# Patient Record
Sex: FEMALE | Race: WHITE | HISPANIC OR LATINO
[De-identification: names, ages, dates, MRNs, and addresses within clinical notes are randomized per-mention and may not be internally consistent; named-entity substitution may affect disease eponyms.]

---

## 2022-07-26 PROBLEM — Z00.00 ENCOUNTER FOR PREVENTIVE HEALTH EXAMINATION: Status: ACTIVE | Noted: 2022-07-26

## 2022-08-18 ENCOUNTER — APPOINTMENT (OUTPATIENT)
Dept: PAIN MANAGEMENT | Facility: CLINIC | Age: 77
End: 2022-08-18

## 2022-08-18 VITALS — BODY MASS INDEX: 40.82 KG/M2 | WEIGHT: 245 LBS | HEIGHT: 65 IN

## 2022-08-18 PROCEDURE — 99204 OFFICE O/P NEW MOD 45 MIN: CPT

## 2022-08-19 NOTE — HISTORY OF PRESENT ILLNESS
[Lower back] : lower back [Sudden] : sudden [9] : 9 [Dull/Aching] : dull/aching [Localized] : localized [Constant] : constant [Household chores] : household chores [Leisure] : leisure [Meds] : meds [Walking] : walking [Bending forward] : bending forward [Extending back] : extending back [] : yes [FreeTextEntry1] : The patient presents for initial evaluation regarding their low back pain. Patient c/o left sided low back pain radiating to the left posterior thigh, stopping at the knee. She reports back pain > LLE pain. Long standing hx with a recent progression. She reports increase pain with prolonged walking. Patient reports having an MILD procedure done on 05/2022 with outside physician in [place]. She reports no observable benefit following the procedure. Was not able to improve walking tolerance. Hx of YASH's in the past with short lived response with multiple outside providers. No benefit with acupuncture. Patient has not done any recent PT. Utilizes OTC Advil PRN. \par  \par Subjective weakness: No \par Lower extremity paresthesias: No \par Bladder/bowel dysfunction: No \par  \par Injections: Yes\par 1) MILD - [place] ([place]) Dr. Zamora \par 2) YASH's - multiple outside providers\par  \par Pertinent Surgical History: N/A \par  \par Imaging: \par MRI Lumbar Spine (06/12/21) -  [facility]\par \par L1-2: There is no significant posterior disc abnormality, spinal canal or foraminal stenosis.\par L2-3: There is diffuse disc bulging, ligamentum flavum thickening and facet arthropathy resulting in mild central canal stenosis with impingement of the descending L3 nerve roots and moderate foraminal narrowing\par L3-4: There is diffuse disc bulging, ligamentum flavum thickening and facet arthropathy resulting in moderate central canal stenosis with impingement of descending L4 nerve roots and severe left moderate right foraminal narrowing compressing the exiting left L3 nerve roots\par L4-5: There is diffuse is bulging, superimposed left foraminal disc herniation, ligamentum flavum thickening and facet arthropathy resulting in moderate to severe central canal stenosis with severe foraminal narrowing compressing the exiting L4 nerve roots\par L5-S1: There is diffuse is bulging, grade 1 anterolisthesis and facet arthropathy resulting in severe right foraminal narrowing compressing exiting right L5 nerve roots\par  \par Physician Disclaimer: I have personally reviewed and confirmed all HPI data with the patient.  [de-identified] : 10/20/2021

## 2022-08-19 NOTE — PHYSICAL EXAM
[de-identified] : Constitutional: \par - No acute distress \par - Obesity \par \par Neurological: \par - normal mood and affect \par - alert and oriented x 3  \par \par Cardiovascular: \par - grossly normal\par \par Lumbar Spine Exam: \par \par Inspection: \par erythema (-) \par ecchymosis (-) \par rashes (-) \par alignment: no scoliosis\par \par Palpation:  \par paraspinal tenderness:                  L (-) ; R (-) \par thoracic paraspinal tenderness:    L (-) ; R (-) \par sciatic nerve tenderness :             L (+) ; R (-) \par SI joint tenderness:                        L (+) ; R (-) \par GTB tenderness:                            L (-);  R (-) \par \par ROM:   \par Full ROM with mild stiffness \par \par Strength:                   Right       Left    \par Hip Flexion:                (5/5)       (5/5) \par Quadriceps:               (5/5)       (5/5) \par Hamstrings:                (5/5)       (5/5) \par Ankle Dorsiflexion:     (5/5)       (5/5) \par EHL:                            (5/5)       (5/5) \par Ankle Plantarflexion:  (5/5)       (5/5) \par \par \par Special Tests: \par SLR:                      R (-) ; L (-) \par Facet loading:       R (-) ; L (+) \par CHARMAINE test:          R (-) ; L (-) \par XSLR:                   R (-) ; L (-) \par Wilfredo's test:        R (-) ; L(-) \par Tight Hamstrings   R (-) ; L (-) \par \par Neurologic: \par Light touch intact throughout LE \par 1+ Reflexes B/L patella \par \par Gait: \par antalgic gait

## 2022-08-19 NOTE — ASSESSMENT
[FreeTextEntry1] : A discussion regarding available pain management treatment options occurred with the patient.  These included interventional, rehabilitative, pharmacological, and alternative modalities. We will proceed with the following: \par \par Interventional treatment options: \par - Proceed with left L3-4, L4-5, L5-S1 facet joint MBB with fluoroscopic guidance; proceed to RFA if adequate relief\par - patient has failed previous YASH's and MILD procedure \par - may be candidate for DCS trial if she is unwilling to consider surgical decompression - not discussed  \par - see additional instructions below \par \par Rehabilitative options: \par - initiate trial of physical therapy \par - participation in active HEP was discussed \par \par Medication based treatment options: \par - initiate trial of gabapentin 300mg TID ; titration schedule provided \par - continue OTC NSAIDs PRN\par - see additional instructions below \par \par Complementary treatment options: \par - Weight management and lifestyle modifications discussed \par - failed acupuncture \par - See additional instructions below \par \par Additional treatment recommendations as follows: \par - ortho spine eval if inadequate relief to conservative care \par - Follow up 1-2 weeks post injection for assessment of efficacy and further recommendations.\par \par Patient presents with axial lumbar pain that has not responded to 3 months of conservative therapy including physical therapy or NSAID therapy.  The pain is interfering with activities of daily living and functionality.  There is no radicular pain.  The pain is exacerbated by facet loading.  Positive Kemps maneuver which is defined by pain reproduction with extension and rotation of the lumbar spine to the affected side.  The patient has not had a vertebral fusion at the levels of the proposed treatment.  There is no unexplained neurologic deficit.  There is no history of systemic infection, unstable medical condition, bleeding tendency, or local infection.  The injection is being performed to diagnose the facet joint as the source of the individual's pain.\par \par The documentation recorded by the scribe, in my presence, accurately reflects the service I personally performed and the decisions made by me with my edits as appropriate.\par \par I, April Eastman acting as scribe, attest that this documentation has been prepared under the direction and in the presence of Provider Ash Mireles DO.

## 2022-09-07 ENCOUNTER — APPOINTMENT (OUTPATIENT)
Dept: PAIN MANAGEMENT | Facility: CLINIC | Age: 77
End: 2022-09-07

## 2022-09-07 PROCEDURE — 64484 NJX AA&/STRD TFRM EPI L/S EA: CPT | Mod: LT

## 2022-09-07 PROCEDURE — 64483 NJX AA&/STRD TFRM EPI L/S 1: CPT | Mod: LT

## 2022-09-07 PROCEDURE — J3490M: CUSTOM

## 2022-09-07 NOTE — PROCEDURE
[FreeTextEntry3] : Date of Service: 09/07/2022 \par \par Account: [id number]\par \par Patient: NI COOMBS \par \par YOB: 1945\par \par Age: 76 year\par \par Surgeon:                  Ash Mireles DO\par \par Assistant:                None\par \par Pre-Operative Diagnosis: Spondylosis of Lumbar Region without Myelopathy or Radiculopathy (M47.816)    \par \par Post Operative Diagnosis: Spondylosis of Lumbar Region without Myelopathy or Radiculopathy (M47.816)    \par \par Procedure:             Left L3-L4, L4-5, L5-S1 facet block under fluoroscopic guidance.\par \par Anesthesia:            MAC\par \par This procedure was carried out using fluoroscopic guidance.  The risks and benefits of the procedure were discussed extensively with the patient.  The consent of the patient was obtained and the following procedure was performed.  A timeout was performed with all essential staff present and the site and side were verified.\par \par The patient was placed in the prone position with a pillow under the abdomen to minimize the lumbar lordosis.  The lumbar area was prepped and draped in a sterile fashion.  The lumbar vertebral bodies were identified and the fluoroscope was obliqued ipsilateral to approximately 30 degrees to reveal the appropriate anatomical view.  The junction of the superior articulate process and transverse process at the left L3, L4, and L5 levels were identified and marked.   The skin at these target points was then localized using 1 cc of 1% Lidocaine at each injection site.  A spinal needle was then introduced and advanced to the above target points at the junction of the SAP and transverse processes until bone was contacted. \par \par After negative aspiration for heme and CSF, an injectate of 1 cc 0.5% Marcaine was injected at each of the injection sites.\par \par The needles were subsequently removed.  Vital signs remained normal.  Pulse oximeter was used throughout the procedure and the patient's pulse and oxygen saturation remained within normal limits.  The patient tolerated the procedure well.  There were no complications.  The patient was instructed to apply ice over the injection sites for twenty minutes every two hours for the next 24 to 48 hours.\par \par Disposition:\par      1. The patient was advised to F/U in 1-2 weeks to assess the response to the injection. \par      2. They were advised to keep a pain diary to report the results of the diagnostic block at their FUV.\par      3. The patient was also instructed to contact me immediately if there were any concerns related to the procedure performed.

## 2022-09-15 ENCOUNTER — APPOINTMENT (OUTPATIENT)
Dept: PAIN MANAGEMENT | Facility: CLINIC | Age: 77
End: 2022-09-15

## 2022-09-15 VITALS — HEIGHT: 65 IN | BODY MASS INDEX: 40.82 KG/M2 | WEIGHT: 245 LBS

## 2022-09-15 PROCEDURE — 99214 OFFICE O/P EST MOD 30 MIN: CPT

## 2022-09-21 ENCOUNTER — APPOINTMENT (OUTPATIENT)
Dept: PAIN MANAGEMENT | Facility: CLINIC | Age: 77
End: 2022-09-21

## 2022-09-21 PROCEDURE — J3490M: CUSTOM

## 2022-09-21 PROCEDURE — 64493 INJ PARAVERT F JNT L/S 1 LEV: CPT | Mod: LT

## 2022-09-21 PROCEDURE — 64494 INJ PARAVERT F JNT L/S 2 LEV: CPT | Mod: LT

## 2022-09-21 PROCEDURE — 64495 INJ PARAVERT F JNT L/S 3 LEV: CPT | Mod: LT

## 2022-09-21 NOTE — PROCEDURE
[FreeTextEntry3] : Date of Service: 09/21/2022 \par \par Account: [id number]\par \par Patient: NI COOMBS \par \par YOB: 1945\par \par Age: 76 year\par \par Surgeon:                  Ash Mireles DO\par \par Assistant:                None\par \par Pre-Operative Diagnosis: Spondylosis of Lumbar Region without Myelopathy or Radiculopathy   \par \par Post Operative Diagnosis: Spondylosis of Lumbar Region without Myelopathy or Radiculopathy\par \par Procedure:             Left L3-L4, L4-5, L5-S1 facet block under fluoroscopic guidance.\par \par Anesthesia:            MAC\par \par This procedure was carried out using fluoroscopic guidance.  The risks and benefits of the procedure were discussed extensively with the patient.  The consent of the patient was obtained and the following procedure was performed.  A timeout was performed with all essential staff present and the site and side were verified.\par \par The patient was placed in the prone position with a pillow under the abdomen to minimize the lumbar lordosis.  The lumbar area was prepped and draped in a sterile fashion.  The lumbar vertebral bodies were identified and the fluoroscope was obliqued ipsilateral to approximately 30 degrees to reveal the appropriate anatomical view.  The junction of the superior articulate process and transverse process at the left L3, L4, and L5 levels were identified and marked.   The skin at these target points was then localized using 1 cc of 1% Lidocaine at each injection site.  A spinal needle was then introduced and advanced to the above target points at the junction of the SAP and transverse processes until bone was contacted. \par \par Fluoroscope then focused on the sacral ala on AP view, and marked at this point.  The skin and subcutaneous structures were localized using 1cc of 1.0 % lidocaine.  A spinal needle was then advanced under fluoroscopic guidance until bone was contacted at the ala. \par \par  After negative aspiration for heme and CSF, an injectate of 1 cc 0.5% Marcaine was injected at each of the injection sites.\par \par The needles were subsequently removed.  Vital signs remained normal.  Pulse oximeter was used throughout the procedure and the patient's pulse and oxygen saturation remained within normal limits.  The patient tolerated the procedure well.  There were no complications.  The patient was instructed to apply ice over the injection sites for twenty minutes every two hours for the next 24 to 48 hours.\par \par Disposition:\par      1. The patient was advised to F/U in 1-2 weeks to assess the response to the injection. \par      2. They were advised to keep a pain diary to report the results of the diagnostic block at their FUV.\par      3. The patient was also instructed to contact me immediately if there were any concerns related to the procedure performed.

## 2022-09-23 NOTE — HISTORY OF PRESENT ILLNESS
[Lower back] : lower back [Sudden] : sudden [5] : 5 [Dull/Aching] : dull/aching [Localized] : localized [Intermittent] : intermittent [Household chores] : household chores [Leisure] : leisure [Meds] : meds [Injection therapy] : injection therapy [Walking] : walking [FreeTextEntry1] : 09/15/22 - Patient presents for a FUV following an left L3-L4, L4-L5, L5-S1 facet joint MBB  #1 on 09/07/22. Patient reports >50% reduction of left sided back pain the day of the procedure. Improved activity of ADL;s. \par \par 08/18/22 - The patient presents for initial evaluation regarding their low back pain. Patient c/o left sided low back pain radiating to the left posterior thigh, stopping at the knee. She reports back pain > LLE pain. Long standing hx with a recent progression. She reports increase pain with prolonged walking. Patient reports having an MILD procedure done on 05/2022 with outside physician in [place]. She reports no observable benefit following the procedure. Was not able to improve walking tolerance. Hx of YASH's in the past with short lived response with multiple outside providers. No benefit with acupuncture. Patient has not done any recent PT. Utilizes OTC Advil PRN. \par  \par Injections:\par 1) MILD procedure - Dr. Zamora ([facility]) \par 2) YASH's - multiple outside providers\par 3) left L3-4, L4-5, L5-S1 facet joint MBB (09/07/22) \par  \par Pertinent Surgical History: N/A \par  \par Imaging: \par MRI Lumbar Spine (06/12/21) -  [facility]\par \par L1-2: There is no significant posterior disc abnormality, spinal canal or foraminal stenosis.\par L2-3: There is diffuse disc bulging, ligamentum flavum thickening and facet arthropathy resulting in mild central canal stenosis with impingement of the descending L3 nerve roots and moderate foraminal narrowing\par L3-4: There is diffuse disc bulging, ligamentum flavum thickening and facet arthropathy resulting in moderate central canal stenosis with impingement of descending L4 nerve roots and severe left moderate right foraminal narrowing compressing the exiting left L3 nerve roots\par L4-5: There is diffuse is bulging, superimposed left foraminal disc herniation, ligamentum flavum thickening and facet arthropathy resulting in moderate to severe central canal stenosis with severe foraminal narrowing compressing the exiting L4 nerve roots\par L5-S1: There is diffuse is bulging, grade 1 anterolisthesis and facet arthropathy resulting in severe right foraminal narrowing compressing exiting right L5 nerve roots\par  \par Physician Disclaimer: I have personally reviewed and confirmed all HPI data with the patient.  [] : This patient has had an injection before: no [FreeTextEntry8] : walking

## 2022-09-23 NOTE — PHYSICAL EXAM
[de-identified] : Constitutional: \par - No acute distress \par - Obesity \par \par Neurological: \par - normal mood and affect \par - alert and oriented x 3  \par \par Cardiovascular: \par - grossly normal\par \par Lumbar Spine Exam: \par \par Inspection: \par erythema (-) \par ecchymosis (-) \par rashes (-) \par alignment: no scoliosis\par \par Palpation:  \par paraspinal tenderness:                  L (-) ; R (-) \par thoracic paraspinal tenderness:    L (-) ; R (-) \par sciatic nerve tenderness :             L (+) ; R (-) \par SI joint tenderness:                        L (+) ; R (-) \par GTB tenderness:                            L (-);  R (-) \par \par ROM:   \par Full ROM with mild stiffness \par \par Strength:                   Right       Left    \par Hip Flexion:                (5/5)       (5/5) \par Quadriceps:               (5/5)       (5/5) \par Hamstrings:                (5/5)       (5/5) \par Ankle Dorsiflexion:     (5/5)       (5/5) \par EHL:                            (5/5)       (5/5) \par Ankle Plantarflexion:  (5/5)       (5/5) \par \par \par Special Tests: \par SLR:                      R (-) ; L (-) \par Facet loading:       R (-) ; L (+) \par CHARMAINE test:          R (-) ; L (-) \par XSLR:                   R (-) ; L (-) \par Wilfredo's test:        R (-) ; L(-) \par Tight Hamstrings   R (-) ; L (-) \par \par Neurologic: \par Light touch intact throughout LE \par 1+ Reflexes B/L patella \par \par Gait: \par antalgic gait

## 2022-09-23 NOTE — ASSESSMENT
[FreeTextEntry1] : A discussion regarding available pain management treatment options occurred with the patient.  These included interventional, rehabilitative, pharmacological, and alternative modalities. We will proceed with the following: \par \par Interventional treatment options: \par - Proceed with repeat left L3-L4, L4-L5, L5-S1 facet joint MBB with fluoroscopic guidance; proceed to RFA if adequate relief\par - patient has failed previous YASH's and MILD procedure with outside providers \par - may be candidate for DCS trial if she is unwilling to consider surgical decompression - not discussed  \par - see additional instructions below \par \par Rehabilitative options: \par - continue trial of physical therapy \par - participation in active HEP was discussed \par \par Medication based treatment options: \par - continue trial of gabapentin 300mg TID ; further titration based upon clinical response \par - continue OTC NSAIDs PRN\par - see additional instructions below \par \par Complementary treatment options: \par - Weight management and lifestyle modifications discussed \par - failed acupuncture \par - See additional instructions below \par \par Additional treatment recommendations as follows: \par - ortho spine eval if inadequate relief to conservative care \par - Follow up 1-2 weeks post injection for assessment of efficacy and further recommendations.\par \par Patient presents with axial lumbar pain that has not responded to 3 months of conservative therapy including physical therapy or NSAID therapy.  The pain is interfering with activities of daily living and functionality.  There is no radicular pain.  The pain is exacerbated by facet loading.  Positive Kemps maneuver which is defined by pain reproduction with extension and rotation of the lumbar spine to the affected side.  The patient has not had a vertebral fusion at the levels of the proposed treatment.  There is no unexplained neurologic deficit.  There is no history of systemic infection, unstable medical condition, bleeding tendency, or local infection.  The injection is being performed to diagnose the facet joint as the source of the individual's pain.\par \par The documentation recorded by the scribe, in my presence, accurately reflects the service I personally performed and the decisions made by me with my edits as appropriate.\par \par I, April Eastman acting as scribe, attest that this documentation has been prepared under the direction and in the presence of Provider Ash Mireles DO.

## 2022-09-23 NOTE — ADDENDUM
[FreeTextEntry1] : Patient requested phone call following second diagnostic medial branch block at the left L3-L4, L4-L5, and L5-S1 facet joint level on 9/21/2022.  The patient experienced greater than 80% reduction of her pain the day of the procedure with ongoing sustained relief of approximately 50%.  She wishes to move forward with radiofrequency ablation.  She has some issues with transportation and cannot make it to the office for follow-up visits.\par \par Patient has lumbosacral axial pain that is consistent with facet joint pathology.  A diagnostic temporary block with local anesthetic of the medial branch was performed and has resulted in at least a 50% reduction in pain for the duration of the specific local anesthetic effect.  The pain is not radicular and there is absence of nerve root compression.  There is no prior spinal fusion surgery at the level targeted.  The pain has failed to respond to three months of conservative therapy.\par \par The risks, benefits and alternatives of the proposed procedure were explained in detail with the patient. The risks outlined include but are not limited to infection, bleeding, post- dural puncture headache, nerve injury, a temporary increase in pain, failure to resolve symptoms, allergic reaction, and possible elevation of blood sugar in diabetics if using corticosteroid.  All questions were answered to patient's apparent satisfaction and he/she verbalized an understanding.

## 2022-09-26 ENCOUNTER — APPOINTMENT (OUTPATIENT)
Dept: PAIN MANAGEMENT | Facility: CLINIC | Age: 77
End: 2022-09-26

## 2022-09-30 ENCOUNTER — APPOINTMENT (OUTPATIENT)
Dept: PAIN MANAGEMENT | Facility: CLINIC | Age: 77
End: 2022-09-30

## 2022-10-05 ENCOUNTER — APPOINTMENT (OUTPATIENT)
Dept: PAIN MANAGEMENT | Facility: CLINIC | Age: 77
End: 2022-10-05

## 2022-10-05 PROCEDURE — 64636Z: CUSTOM | Mod: LT

## 2022-10-05 PROCEDURE — J3490M: CUSTOM

## 2022-10-05 PROCEDURE — 64635 DESTROY LUMB/SAC FACET JNT: CPT | Mod: LT

## 2022-10-05 NOTE — PROCEDURE
[FreeTextEntry3] : Date of Service: 10/05/2022 \par \par Account: [id number]\par \par Patient: NI COOMBS \par \par YOB: 1945\par \par Age: 76 year\par \par Surgeon:                  Ash Mireles DO\par \par Assistant:                None\par \par Pre-Operative Diagnosis:   Spondylosis of Lumbar Region without Myelopathy or Radiculopathy\par  \par Post Operative Diagnosis: Spondylosis of Lumbar Region without Myelopathy or Radiculopathy \par \par Procedure:             Left L3-L4, L4-5, L5-S1 facet joint radiofrequency ablation with fluoroscopic guidance.\par \par Anesthesia:            MAC\par \par This procedure was carried out using fluoroscopic guidance.  The risks and benefits of the procedure were discussed extensively with the patient.  The consent of the patient was obtained and the following procedure was performed. The patient was placed in the prone position on the fluoroscopy table and the area was prepped and draped in a sterile fashion.  A timeout was performed with all essential staff present and the site and side were verified.\par \par Under A/P visualization, the left sacral ala was identified and marked.  Using a 25 gauge needle the skin and subcutaneous structures at this point were localized with approximately 3 mL of 1% Lidocaine.  After this, an 18-gauge 15 cm radiofrequency needle with a 10 mm curved active tip was inserted under fluoroscopic visualization until the needle made contact with the sacral ala. \par \par The fluoroscope was then redirected under A/P view to visualize the left L3-L4, L4-L5, L5-S1 facet joint levels. The camera was obliqued to approximately 30 degrees to reveal good Gomez dog anatomical view. The junction of the superior articulate process and transverse process at the targeted levels were then identified and marked. Skin and subcutaneous structures were then anesthetized with approximately 3 mL of 1% Lidocaine at each of these levels.  \par \par After this, an 18-gauge 15 cm radiofrequency needle with a 10 mm curved active tip then advanced until the junction at the SAP and transverse process was met at each level.  Both ipsilateral oblique and lateral fluoroscopic views were obtained in order to advance the needle to the intended targets which was at the junction of the SAP and transverse process. \par \par At all the treated levels, sensory stimulation was performed at 50 Hz not exceeding 1.0 volt and motor stimulation testing at 2 Hz not exceeding 3.0 volts.  There was no abnormal sensory or motor stimulation observed or reported by the patient most notably in the lower extremities.\par \par Each treated level was then anesthetized with approximately 1 ml of 0.25% Marcaine. After which each area was then ablated at 80 degrees centigrade for 90 seconds each. Patient felt no pain reproduction during the ablation procedure.\par \par The needles were subsequently removed.  Vital signs remained normal.  Pulse oximeter was used throughout the procedure and the patient's pulse and oxygen saturation remained within normal limits.  The patient tolerated the procedure well.  There were no complications.  The patient was instructed to apply ice over the injection sites for twenty minutes every two hours for the next 24 to 48 hours.\par \par Disposition:\par      1. The patient was advised to F/U in 4 weeks to assess the response to the procedure. \par      2. The patient was also instructed to contact me immediately if there were any concerns related to the procedure performed.

## 2022-10-17 ENCOUNTER — APPOINTMENT (OUTPATIENT)
Dept: PAIN MANAGEMENT | Facility: CLINIC | Age: 77
End: 2022-10-17

## 2022-10-20 ENCOUNTER — APPOINTMENT (OUTPATIENT)
Dept: PAIN MANAGEMENT | Facility: CLINIC | Age: 77
End: 2022-10-20

## 2022-10-20 VITALS — HEIGHT: 65 IN | WEIGHT: 245 LBS | BODY MASS INDEX: 40.82 KG/M2

## 2022-10-20 PROCEDURE — 99213 OFFICE O/P EST LOW 20 MIN: CPT

## 2022-10-21 NOTE — PHYSICAL EXAM
[de-identified] : Constitutional: \par - No acute distress \par - Obesity \par \par Neurological: \par - normal mood and affect \par - alert and oriented x 3  \par \par Cardiovascular: \par - Bilateral lower extremity edema\par \par Lumbar Spine Exam: \par \par Inspection: \par erythema (-) \par ecchymosis (-) \par rashes (-) \par alignment: no scoliosis\par \par Palpation:  \par paraspinal tenderness:                  L (-) ; R (-) \par thoracic paraspinal tenderness:    L (-) ; R (-) \par sciatic nerve tenderness :             L (+) ; R (-) \par SI joint tenderness:                        L (+) ; R (-) \par GTB tenderness:                            L (-);  R (-) \par \par ROM:   \par Full ROM with mild stiffness at extremes of extension\par Pain at extremes of extension\par \par Strength:                   Right       Left    \par Hip Flexion:                (5/5)       (5/5) \par Quadriceps:               (5/5)       (5/5) \par Hamstrings:                (5/5)       (5/5) \par Ankle Dorsiflexion:     (5/5)       (5/5) \par EHL:                            (5/5)       (5/5) \par Ankle Plantarflexion:  (5/5)       (5/5) \par \par Special Tests: \par SLR:                      R (-) ; L (-) \par Facet loading:       R (-) ; L (+) \par CHARMAINE test:          R (-) ; L (-)\par Tight Hamstrings   R (-) ; L (-) \par \par Neurologic: \par Light touch intact throughout LE bilaterally\par 1+ Reflexes B/L patella bilaterally\par Absent Achilles reflexes bilaterally\par \par Gait: \par antalgic gait \par Ambulates with assistance of walker

## 2022-10-21 NOTE — ASSESSMENT
[FreeTextEntry1] : A discussion regarding available pain management treatment options occurred with the patient.  These included interventional, rehabilitative, pharmacological, and alternative modalities. We will proceed with the following: \par \par Interventional treatment options: \par - could proceed with repeat left L3-L4, L4-L5, L5-S1 facet joint RFA with return of severe axial lower back pain; target q6-12 months. \par - patient has failed previous YASH's and MILD procedure with outside providers \par - may be candidate for DCS trial if she is unwilling to consider surgical decompression - briefly discussed in further detail\par - see additional instructions below \par \par Rehabilitative options: \par - continue trial of physical therapy \par - participation in active HEP was discussed \par \par Medication based treatment options: \par - continue trial of gabapentin 300mg TID ; further titration based upon clinical response.  Caution given questionable lower extremity edema\par - continue OTC NSAIDs PRN\par - see additional instructions below \par \par Complementary treatment options: \par - Weight management and lifestyle modifications discussed \par - failed acupuncture\par \par Additional treatment recommendations as follows: \par - ortho spine evaluation if inadequate relief to conservative care \par - Follow up in 6-8 weeks to assess on going response\par \par We have discussed the risks, benefits, and alternatives NSAID therapy including but not limited to the risk of bleeding, thrombosis, gastric mucosal irritation/ulceration, allergic reaction and kidney dysfunction; the patient verbalizes an understanding.\par \par The documentation recorded by the scribe, in my presence, accurately reflects the service I personally performed and the decisions made by me with my edits as appropriate.\par \par I, Srinivas Villanueva acting as scribe, attest that this documentation has been prepared under the direction and in the presence of Provider Ash Mireles DO.

## 2022-10-21 NOTE — HISTORY OF PRESENT ILLNESS
[Lower back] : lower back [Sudden] : sudden [5] : 5 [Dull/Aching] : dull/aching [Localized] : localized [Intermittent] : intermittent [Household chores] : household chores [Leisure] : leisure [Meds] : meds [Injection therapy] : injection therapy [Walking] : walking [FreeTextEntry1] : 10/20/2022 -  Patient presents after left L3-L4, L4-L5, L5-S1 facet joint RFA on 10/5/2022.  Patient reports an overall >80% sustained relief of axial left sided lower back pain.  She reports improved walking and standing tolerance. Reports that she has been ambulating more often without the assistance of her walker. Patient reports a minimal to no left axial lower back pain, reports that the majority of her pain is in the left lower buttock and thigh. Patient reports that since she has been taking gabapentin (currently on 300mg TID) she began to experience mild edema in her bilateral lower extremities.  Despite this she finds gabapentin helpful in reducing her pain. \par \par 9/15/22 - Patient presents for a FUV following an left L3-L4, L4-L5, L5-S1 facet joint MBB  #1 on 09/07/22. Patient reports >50% reduction of left sided back pain the day of the procedure. Improved activity of ADL;s. \par \par 08/18/22 - The patient presents for initial evaluation regarding their low back pain. Patient c/o left sided low back pain radiating to the left posterior thigh, stopping at the knee. She reports back pain > LLE pain. Long standing hx with a recent progression. She reports increase pain with prolonged walking. Patient reports having an MILD procedure done on 05/2022 with outside physician in [place]. She reports no observable benefit following the procedure. Was not able to improve walking tolerance. Hx of YASH's in the past with short lived response with multiple outside providers. No benefit with acupuncture. Patient has not done any recent PT. Utilizes OTC Advil PRN. \par  \par Injections:\par 1) MILD procedure - Dr. Zamora ([facility]) \par 2) YASH's - multiple outside providers\par 3) Left L3-4, L4-5, L5-S1 facet joint MBB (09/07/22, 09/21/22)\par 4) left L3-4, L4-5, L5-S1 facet joint RFA (10/5/2022) \par \par Pertinent Surgical History: N/A \par  \par Imaging: \par MRI Lumbar Spine (06/12/21) -  [facility]\par \par L1-2: There is no significant posterior disc abnormality, spinal canal or foraminal stenosis.\par L2-3: There is diffuse disc bulging, ligamentum flavum thickening and facet arthropathy resulting in mild central canal stenosis with impingement of the descending L3 nerve roots and moderate foraminal narrowing\par L3-4: There is diffuse disc bulging, ligamentum flavum thickening and facet arthropathy resulting in moderate central canal stenosis with impingement of descending L4 nerve roots and severe left moderate right foraminal narrowing compressing the exiting left L3 nerve roots\par L4-5: There is diffuse is bulging, superimposed left foraminal disc herniation, ligamentum flavum thickening and facet arthropathy resulting in moderate to severe central canal stenosis with severe foraminal narrowing compressing the exiting L4 nerve roots\par L5-S1: There is diffuse is bulging, grade 1 anterolisthesis and facet arthropathy resulting in severe right foraminal narrowing compressing exiting right L5 nerve roots\par  \par Physician Disclaimer: I have personally reviewed and confirmed all HPI data with the patient.  [] : no [FreeTextEntry8] : walking

## 2022-12-08 ENCOUNTER — APPOINTMENT (OUTPATIENT)
Dept: PAIN MANAGEMENT | Facility: CLINIC | Age: 77
End: 2022-12-08

## 2023-02-23 ENCOUNTER — RX RENEWAL (OUTPATIENT)
Age: 78
End: 2023-02-23

## 2023-06-05 ENCOUNTER — APPOINTMENT (OUTPATIENT)
Dept: PAIN MANAGEMENT | Facility: CLINIC | Age: 78
End: 2023-06-05
Payer: MEDICARE

## 2023-06-05 VITALS — WEIGHT: 245 LBS | HEIGHT: 65 IN | BODY MASS INDEX: 40.82 KG/M2

## 2023-06-05 PROCEDURE — 99214 OFFICE O/P EST MOD 30 MIN: CPT

## 2023-06-05 NOTE — PHYSICAL EXAM
[de-identified] : Constitutional: \par - No acute distress \par - Obesity \par \par Neurological: \par - normal mood and affect \par - alert and oriented x 3  \par \par Cardiovascular: \par - Bilateral lower extremity edema\par \par Lumbar Spine Exam: \par \par Inspection: \par erythema (-) \par ecchymosis (-) \par rashes (-) \par alignment: no scoliosis\par \par Palpation:  \par paraspinal tenderness:                  L (-) ; R (-) \par thoracic paraspinal tenderness:    L (-) ; R (-) \par sciatic nerve tenderness :             L (+) ; R (-) \par SI joint tenderness:                        L (+) ; R (-) \par GTB tenderness:                            L (-);  R (-) \par \par ROM:   \par Full ROM with mild stiffness at extremes of extension\par Pain at extremes of extension\par \par Strength:                   Right       Left    \par Hip Flexion:                (5/5)       (5/5) \par Quadriceps:               (5/5)       (5/5) \par Hamstrings:                (5/5)       (5/5) \par Ankle Dorsiflexion:     (5/5)       (5/5) \par EHL:                            (5/5)       (5/5) \par Ankle Plantarflexion:  (5/5)       (5/5) \par \par Special Tests: \par SLR:                      R (-) ; L (+) \par Facet loading:       R (-) ; L (-) \par CHARMAINE test:          R (-) ; L (-)\par Tight Hamstrings   R (-) ; L (-) \par \par Neurologic: \par Light touch intact throughout LE bilaterally\par 1+ Reflexes B/L patella bilaterally\par Absent Achilles reflexes bilaterally\par \par Gait: \par antalgic gait \par Ambulates with assistance of walker

## 2023-06-05 NOTE — ASSESSMENT
[FreeTextEntry1] : A discussion regarding available pain management treatment options occurred with the patient.  These included interventional, rehabilitative, pharmacological, and alternative modalities. We will proceed with the following: \par \par Interventional treatment options:\par - Can consider trial of lumbar YASH for left radicular symptoms pending updated MRI imaging\par - could proceed with repeat left L3-L4, L4-L5, L5-S1 facet joint RFA with return of severe axial lower back pain\par - patient has failed previous YASH's and MILD procedure with outside providers \par - Discussed [place] DCS trial in further detail; informational materials provided\par - see additional instructions below \par \par Rehabilitative options: \par - continue trial of physical therapy \par - participation in active HEP was discussed \par \par Medication based treatment options: \par - continue  gabapentin 300mg TID; further titration based upon clinical response\par - Caution with increasing doses of gabapentin and consideration of lower extremity edema\par - continue OTC NSAIDs on PRN basis\par - see additional instructions below \par \par Complementary treatment options: \par - Weight management and lifestyle modifications discussed \par - failed acupuncture\par \par Additional treatment recommendations as follows: \par - Patient continues to defer ortho spine evaluation\par - Obtain new MRI lumbar spine to assess progression of spinal stenosis \par - obtain Thoracic MRI - pre DCS planning\par - Follow-up for MRI imaging review\par \par We have discussed the risks, benefits, and alternatives NSAID therapy including but not limited to the risk of bleeding, thrombosis, gastric mucosal irritation/ulceration, allergic reaction and kidney dysfunction; the patient verbalizes an understanding.\par \par The documentation recorded by the scribe, in my presence, accurately reflects the service I personally performed and the decisions made by me with my edits as appropriate.\par \par I, Timothy ESTRADA, personally performed the services described in this documentation incident to Ash Mireles DO.

## 2023-06-05 NOTE — HISTORY OF PRESENT ILLNESS
[Lower back] : lower back [Sudden] : sudden [5] : 5 [Dull/Aching] : dull/aching [Localized] : localized [Intermittent] : intermittent [Household chores] : household chores [Leisure] : leisure [Meds] : meds [Injection therapy] : injection therapy [Walking] : walking [FreeTextEntry1] : 6/5/2023- Patient presents for FUV regarding their lower back pain.  Patient back from [place].  She continue to complain of left buttock pain with radiation down her leg associated with numbness.  She continues to take Gabapentin 300 mg TID.  She was recently given a TPI in her left buttock by a pain physician and was started on Lyrica.  Patient self D/c Lyrica d/t being ineffective.  Patient continues to participate in physical therapy with benefit.  Continues to report benefit of left-sided axial low back pain from RFA performed over 8 months ago.\par \par 10/20/2022 -  Patient presents after left L3-L4, L4-L5, L5-S1 facet joint RFA on 10/5/2022.  Patient reports an overall >80% sustained relief of axial left sided lower back pain.  She reports improved walking and standing tolerance. Reports that she has been ambulating more often without the assistance of her walker. Patient reports a minimal to no left axial lower back pain, reports that the majority of her pain is in the left lower buttock and thigh. Patient reports that since she has been taking gabapentin (currently on 300mg TID) she began to experience mild edema in her bilateral lower extremities.  Despite this she finds gabapentin helpful in reducing her pain. \par \par 9/15/22 - Patient presents for a FUV following an left L3-L4, L4-L5, L5-S1 facet joint MBB  #1 on 09/07/22. Patient reports >50% reduction of left sided back pain the day of the procedure. Improved activity of ADL;s. \par \par 08/18/22 - The patient presents for initial evaluation regarding their low back pain. Patient c/o left sided low back pain radiating to the left posterior thigh, stopping at the knee. She reports back pain > LLE pain. Long standing hx with a recent progression. She reports increase pain with prolonged walking. Patient reports having an MILD procedure done on 05/2022 with outside physician in [place]. She reports no observable benefit following the procedure. Was not able to improve walking tolerance. Hx of YASH's in the past with short lived response with multiple outside providers. No benefit with acupuncture. Patient has not done any recent PT. Utilizes OTC Advil PRN. \par  \par Injections:\par 1) MILD procedure - Dr. Zamora ([facility]) \par 2) YASH's - multiple outside providers\par 3) left L3-4, L4-5, L5-S1 facet joint RFA (10/5/2022) \par \par Pertinent Surgical History: N/A \par  \par Imaging: \par MRI Lumbar Spine (06/12/21) -  [facility]\par \par L1-2: There is no significant posterior disc abnormality, spinal canal or foraminal stenosis.\par L2-3: There is diffuse disc bulging, ligamentum flavum thickening and facet arthropathy resulting in mild central canal stenosis with impingement of the descending L3 nerve roots and moderate foraminal narrowing\par L3-4: There is diffuse disc bulging, ligamentum flavum thickening and facet arthropathy resulting in moderate central canal stenosis with impingement of descending L4 nerve roots and severe left moderate right foraminal narrowing compressing the exiting left L3 nerve roots\par L4-5: There is diffuse is bulging, superimposed left foraminal disc herniation, ligamentum flavum thickening and facet arthropathy resulting in moderate to severe central canal stenosis with severe foraminal narrowing compressing the exiting L4 nerve roots\par L5-S1: There is diffuse is bulging, grade 1 anterolisthesis and facet arthropathy resulting in severe right foraminal narrowing compressing exiting right L5 nerve roots\par  \par Physician Disclaimer: I have personally reviewed and confirmed all HPI data with the patient.  [] : This patient has had an injection before: no [FreeTextEntry8] : walking

## 2023-06-07 ENCOUNTER — FORM ENCOUNTER (OUTPATIENT)
Age: 78
End: 2023-06-07

## 2023-06-08 ENCOUNTER — APPOINTMENT (OUTPATIENT)
Dept: MRI IMAGING | Facility: CLINIC | Age: 78
End: 2023-06-08
Payer: MEDICARE

## 2023-06-08 PROCEDURE — 72148 MRI LUMBAR SPINE W/O DYE: CPT | Mod: MH

## 2023-06-08 PROCEDURE — 72146 MRI CHEST SPINE W/O DYE: CPT | Mod: MH

## 2023-06-22 ENCOUNTER — APPOINTMENT (OUTPATIENT)
Dept: PAIN MANAGEMENT | Facility: CLINIC | Age: 78
End: 2023-06-22
Payer: MEDICARE

## 2023-06-22 VITALS — BODY MASS INDEX: 40.82 KG/M2 | WEIGHT: 245 LBS | HEIGHT: 65 IN

## 2023-06-22 PROCEDURE — 99214 OFFICE O/P EST MOD 30 MIN: CPT

## 2023-06-22 NOTE — ASSESSMENT
[FreeTextEntry1] : A discussion regarding available pain management treatment options occurred with the patient.  These included interventional, rehabilitative, pharmacological, and alternative modalities. We will proceed with the following: \par \par Interventional treatment options:\par - Proceed with left L3-L4, L4-L5 TFESI with fluoroscopic guidance\par - We will proceed with [place] DCS trial for refractory radicular pain following YASH\par - could proceed with repeat left L3-L4, L4-L5, L5-S1 facet joint RFA with return of severe axial lower back pain\par - patient has failed previous YASH's and MILD procedure with outside providers \par - see additional instructions below \par \par Rehabilitative options: \par - continue physical therapy \par - participation in active HEP was discussed \par \par Medication based treatment options: \par -Increase gabapentin 1200 mg daily; further titration based upon clinical response\par - Caution with increasing doses of gabapentin and consideration of lower extremity edema\par - continue OTC NSAIDs on PRN basis\par - see additional instructions below \par \par Complementary treatment options: \par - Weight management and lifestyle modifications discussed \par - failed acupuncture\par \par Additional treatment recommendations as follows: \par - Patient continues to defer ortho spine evaluation\par - Obtain psychological evaluation in anticipation of DCS trial -referral provided\par - Follow up 1-2 weeks post injection for assessment of efficacy and further treatment recommendations\par \par We have discussed the risks, benefits, and alternatives NSAID therapy including but not limited to the risk of bleeding, thrombosis, gastric mucosal irritation/ulceration, allergic reaction and kidney dysfunction; the patient verbalizes an understanding.\par \par The risks, benefits and alternatives of the proposed procedure were explained in detail with the patient. The risks outlined include but are not limited to infection, bleeding, post- dural puncture headache, nerve injury, a temporary increase in pain, failure to resolve symptoms, allergic reaction, and possible elevation of blood sugar in diabetics if using corticosteroid.  All questions were answered to patient's apparent satisfaction and he/she verbalized an understanding.\par \par The documentation recorded by the scribe, in my presence, accurately reflects the service I personally performed and the decisions made by me with my edits as appropriate.\par \par I, Geo Singh acting as scribe, attest that this documentation has been prepared under the direction and in the presence of Provider Ash Mireles DO.

## 2023-06-22 NOTE — HISTORY OF PRESENT ILLNESS
[Lower back] : lower back [Sudden] : sudden [5] : 5 [Dull/Aching] : dull/aching [Localized] : localized [Intermittent] : intermittent [Household chores] : household chores [Leisure] : leisure [Meds] : meds [Injection therapy] : injection therapy [Walking] : walking [FreeTextEntry1] : 6/22/2023- Patient presents for FUV regarding their lower back pain, and to review the lumbar and thoracic spine MRIs done on 6/8/2023.   Her pain symptoms have not changed since the last visit, her lower back pain remains resolved from the previous RFA, and she still has radicular pain in the lateral and anterior left thigh, and occasionally the posterior left thigh.  Wishes to discuss candidacy for DCS trial\par \par 6/5/2023- Patient presents for FUV regarding their lower back pain.  Patient back from [place].  She continue to complain of left buttock pain with radiation down her leg associated with numbness.  She continues to take Gabapentin 300 mg TID.  She was recently given a TPI in her left buttock by a pain physician and was started on Lyrica.  Patient self D/c Lyrica d/t being ineffective.  Patient continues to participate in physical therapy with benefit.  Continues to report benefit of left-sided axial low back pain from RFA performed over 8 months ago.\par \par 10/20/2022 -  Patient presents after left L3-L4, L4-L5, L5-S1 facet joint RFA on 10/5/2022.  Patient reports an overall >80% sustained relief of axial left sided lower back pain.  She reports improved walking and standing tolerance. Reports that she has been ambulating more often without the assistance of her walker. Patient reports a minimal to no left axial lower back pain, reports that the majority of her pain is in the left lower buttock and thigh. Patient reports that since she has been taking gabapentin (currently on 300mg TID) she began to experience mild edema in her bilateral lower extremities.  Despite this she finds gabapentin helpful in reducing her pain. \par \par 9/15/22 - Patient presents for a FUV following an left L3-L4, L4-L5, L5-S1 facet joint MBB  #1 on 09/07/22. Patient reports >50% reduction of left sided back pain the day of the procedure. Improved activity of ADL;s. \par \par 08/18/22 - The patient presents for initial evaluation regarding their low back pain. Patient c/o left sided low back pain radiating to the left posterior thigh, stopping at the knee. She reports back pain > LLE pain. Long standing hx with a recent progression. She reports increase pain with prolonged walking. Patient reports having an MILD procedure done on 05/2022 with outside physician in [place]. She reports no observable benefit following the procedure. Was not able to improve walking tolerance. Hx of YASH's in the past with short lived response with multiple outside providers. No benefit with acupuncture. Patient has not done any recent PT. Utilizes OTC Advil PRN. \par  \par Injections:\par 1) MILD procedure - Dr. Zamora ([facility]) \par 2) YASH's - multiple outside providers\par 3) left L3-4, L4-5, L5-S1 facet joint RFA (10/5/2022) \par \par Pertinent Surgical History: N/A \par  \par Imaging: \par 1) MRI Lumbar Spine (6/8/2023)- [facility]\par Findings: There is exaggerated thoracic kyphosis, exaggerated lumbar lordosis, and convexity of the upper \par lumbar spine towards the right and lower lumbar spine towards the left with grade 1 anterolisthesis at L3-L4, \par L4-L5, and L5-S1. There are degenerative changes in the lower thoracic spine with right paracentral/foraminal \par herniation impinging the right exiting nerve root at T10-T11. The conus appears unremarkable. There are findings suggesting multiple small bilateral renal cysts incompletely evaluated on the current exam. There is no gross retroperitoneal adenopathy or mass.\par L1-L2: There is mild diffuse disc bulging and facet hypertrophy.\par L2-L3: There is disc bulging, bony ridging, facet arthrosis, broad-based posterior disc herniation, mild central stenosis, and bilateral exiting L2 nerve root impingement.\par L3-L4: There is disc bulging, bony ridging, facet arthrosis, broad-based posterior disc herniation, moderate central stenosis, and left greater than right exiting nerve root impingement.\par L4-L5: There is disc bulging, bony ridging, facet arthrosis, broad-based posterior disc herniation, moderate-tosevere central stenosis asymmetric on the right and impingement upon right greater than left exiting L4 nerve \par roots.\par L5-S1: There is disc bulging, bony ridging, facet arthrosis, and right foraminal herniation impinging the right exiting L5 nerve root.\par \par 2) MRI Thoracic Spine (6/8/2023)- [facility]\par Findings: There is straightening of the visualized cervical lordosis and degenerative changes in the visualized \par cervical spine, incompletely evaluated on the current exam. There is mild exaggerated thoracic kyphosis. There \par is multilevel disc desiccation in the thoracic spine. There is loss of disc height with mild degenerative endplate \par changes at C7-T1. There is a small posterior central protrusion effacing the thecal sac at T5-T6. There is a small \par posterior central protrusion effacing the thecal sac at T6-T7. There is a small posterior central protrusion \par effacing the thecal sac at T7-T8. There is a right paracentral/foraminal herniation impinging the right exiting \par nerve root at T10-T11 with asymmetric right foraminal narrowing. There are mild scattered degenerative \par endplate changes and anterior spondylosis without acute thoracic vertebral body fracture. The thoracic spinal \par cord appears intact. There is nonspecific soft tissue scarring and fibrosis in the subcutaneous fat along the \par midline of the upper thoracic spine possibly related to prior surgery without edema or swelling in the area. \par There are findings suggesting multiple small bilateral renal cysts incompletely evaluated on the current exam. \par There is no gross paravertebral soft tissue mass. There is no cord compression.\par \par 3) MRI Lumbar Spine (06/12/21) -  [facility]\par \par Physician Disclaimer: I have personally reviewed and confirmed all HPI data with the patient.  [] : This patient has had an injection before: no [FreeTextEntry8] : walking

## 2023-06-22 NOTE — PHYSICAL EXAM
[de-identified] : Constitutional: \par - No acute distress \par - Obesity \par \par Neurological: \par - normal mood and affect \par - alert and oriented x 3  \par \par Cardiovascular: \par - Bilateral lower extremity edema\par \par Lumbar Spine Exam: \par \par Inspection: \par erythema (-) \par ecchymosis (-) \par rashes (-) \par alignment: no scoliosis\par \par Palpation:  \par paraspinal tenderness:                  L (-) ; R (-) \par thoracic paraspinal tenderness:    L (-) ; R (-) \par sciatic nerve tenderness :             L (+) ; R (-) \par SI joint tenderness:                        L (+) ; R (-) \par GTB tenderness:                            L (-);  R (-) \par \par ROM:   \par Full ROM with mild stiffness at extremes of extension\par Pain at extremes of extension\par \par Strength:                   Right       Left    \par Hip Flexion:                (5/5)       (5/5) \par Quadriceps:               (5/5)       (5/5) \par Hamstrings:                (5/5)       (5/5) \par Ankle Dorsiflexion:     (5/5)       (5/5) \par EHL:                            (5/5)       (5/5) \par Ankle Plantarflexion:  (5/5)       (5/5) \par \par Special Tests: \par SLR:                      R (-) ; L (+) \par Facet loading:       R (-) ; L (-) \par CHARMAINE test:          R (-) ; L (-)\par Tight Hamstrings   R (-) ; L (-) \par \par Neurologic: \par Light touch intact throughout LE bilaterally\par 1+ Reflexes B/L patella bilaterally\par Absent Achilles reflexes bilaterally\par \par Gait: \par antalgic gait \par Ambulates with assistance of walker

## 2023-06-30 ENCOUNTER — APPOINTMENT (OUTPATIENT)
Dept: PAIN MANAGEMENT | Facility: CLINIC | Age: 78
End: 2023-06-30
Payer: MEDICARE

## 2023-06-30 PROCEDURE — 64483 NJX AA&/STRD TFRM EPI L/S 1: CPT | Mod: LT

## 2023-06-30 PROCEDURE — 64484 NJX AA&/STRD TFRM EPI L/S EA: CPT | Mod: LT

## 2023-06-30 NOTE — PROCEDURE
[FreeTextEntry3] : Date of Service: 06/30/2023 \par \par Account: [id number]\par \par Patient: NI COOMBS \par \par YOB: 1945\par \par Age: 77 year\par \par Surgeon: Ash Mireles DO\par \par Assistant: None.\par \par Pre-Operative Diagnosis:   Lumbosacral Radiculitis (M54.17)\par \par Post Operative Diagnosis: Lumbosacral Radiculitis (M54.17)\par \par Procedure: Left L3-L4, L4-L5 transforaminal epidural steroid injection under fluoroscopic guidance.\par \par Anesthesia: MAC\par \par This procedure was carried out using fluoroscopic guidance.  The risks and benefits of the procedure were discussed extensively with the patient.  The consent of the patient was obtained and the following procedure was performed. The patient was placed in the prone position on the fluoroscopy table and the area was prepped and draped in a sterile fashion.  A timeout was performed with all essential staff present and the site and side were verified.\par \par The left L3-L4 neural foramen was then identified on right oblique "carmen dog" anatomical view at the 6 o' clock position using fluoroscopic guidance, and the area was marked. The overlying skin and subcutaneous structures were anesthetized using sterile technique with 1% Lidocaine.   A 22 gauge 5 inch spinal needle was directed toward the inferior (6 o'clock) position of the pedicle, which formed the roof of the identified foramen.  Once in the epidural space, after negative aspiration for heme and CSF, 1cc of Omnipaque contrast was injected to confirm epidural location and assess filling defects and rule out intravascular needle placement.\par \par Lumbar epidurogram showed no intravascular or intrathecal flow pattern.  No blood or CSF was aspirated.  Omnipaque spread medially in epidural space and outlined the exiting nerve root.\par \par After this, 2.5 cc of a mixture of 4 cc of preservative free normal saline plus 40 mg of Kenalog was injected in the epidural space\par \par The left L4-L5 neural foramen was then identified on right oblique "carmen dog" anatomical view at the 6 o' clock position using fluoroscopic guidance, and the area was marked. The overlying skin and subcutaneous structures were anesthetized using sterile technique with 1% Lidocaine.   A 22 gauge 5 inch spinal needle was directed toward the inferior (6 o'clock) position of the pedicle, which formed the roof of the identified foramen.  Once in the epidural space, after negative aspiration for heme and CSF, 1cc of Omnipaque contrast was injected to confirm epidural location and assess filling defects and rule out intravascular needle placement.\par \par Lumbar epidurogram showed no intravascular or intrathecal flow pattern.  No blood or CSF was aspirated. Omnipaque spread medially in epidural space and outlined the exiting nerve root.\par \par After this, the remainder of the injectate listed above was injected in the epidural space.\par \par Vital signs remained normal throughout the procedure.  The patient tolerated the procedure well.  There were no immediate complications from the performed procedure.  The patient was instructed to apply ice over the injection sites for twenty minutes every two hours for the next 24 hours.\par \par Disposition:\par      1. The patient was advised to F/U in 1-2 weeks to assess the response to the injection.\par      2. The patient was also instructed to contact me immediately if there were any concerns related to the procedure performed.

## 2023-07-17 ENCOUNTER — APPOINTMENT (OUTPATIENT)
Dept: PAIN MANAGEMENT | Facility: CLINIC | Age: 78
End: 2023-07-17
Payer: MEDICARE

## 2023-07-17 VITALS — WEIGHT: 245 LBS | BODY MASS INDEX: 40.82 KG/M2 | HEIGHT: 65 IN

## 2023-07-17 PROCEDURE — 99214 OFFICE O/P EST MOD 30 MIN: CPT

## 2023-07-17 RX ORDER — GABAPENTIN 600 MG/1
600 TABLET, COATED ORAL 3 TIMES DAILY
Qty: 270 | Refills: 1 | Status: ACTIVE | COMMUNITY
Start: 2023-07-17 | End: 1900-01-01

## 2023-07-17 RX ORDER — GABAPENTIN 300 MG/1
300 CAPSULE ORAL 3 TIMES DAILY
Qty: 90 | Refills: 2 | Status: DISCONTINUED | COMMUNITY
Start: 2022-08-18 | End: 2023-07-17

## 2023-07-17 NOTE — HISTORY OF PRESENT ILLNESS
[Lower back] : lower back [Sudden] : sudden [9] : 9 [Dull/Aching] : dull/aching [Localized] : localized [Constant] : constant [Household chores] : household chores [Leisure] : leisure [Meds] : meds [Walking] : walking [Bending forward] : bending forward [Extending back] : extending back [] : yes [FreeTextEntry1] : 7/17/2023 - Patient presents for follow-up visit after left L3-L4, L4-L5 TFESI on 6/30/2023.  She reports some improvement of her left radicular pain following YASH.  She feels that she is doing better than she has done over the last few years following interventional treatment.  She still continues to experience pain and limitation in functionality however.  She is currently involved in physical therapy also with benefit.  Currently on gabapentin 600 mg BID.  Wishes to discuss DCS trial once again in further detail.\par \par 6/22/2023- Patient presents for FUV regarding their lower back pain, and to review the lumbar and thoracic spine MRIs done on 6/8/2023.   Her pain symptoms have not changed since the last visit, her lower back pain remains resolved from the previous RFA, and she still has radicular pain in the lateral and anterior left thigh, and occasionally the posterior left thigh.  Wishes to discuss candidacy for DCS trial\par \par 6/5/2023- Patient presents for FUV regarding their lower back pain.  Patient back from [place].  She continue to complain of left buttock pain with radiation down her leg associated with numbness.  She continues to take Gabapentin 300 mg TID.  She was recently given a TPI in her left buttock by a pain physician and was started on Lyrica.  Patient self D/c Lyrica d/t being ineffective.  Patient continues to participate in physical therapy with benefit.  Continues to report benefit of left-sided axial low back pain from RFA performed over 8 months ago.\par \par 10/20/2022 -  Patient presents after left L3-L4, L4-L5, L5-S1 facet joint RFA on 10/5/2022.  Patient reports an overall >80% sustained relief of axial left sided lower back pain.  She reports improved walking and standing tolerance. Reports that she has been ambulating more often without the assistance of her walker. Patient reports a minimal to no left axial lower back pain, reports that the majority of her pain is in the left lower buttock and thigh. Patient reports that since she has been taking gabapentin (currently on 300mg TID) she began to experience mild edema in her bilateral lower extremities.  Despite this she finds gabapentin helpful in reducing her pain. \par \par 9/15/22 - Patient presents for a FUV following an left L3-L4, L4-L5, L5-S1 facet joint MBB  #1 on 09/07/22. Patient reports >50% reduction of left sided back pain the day of the procedure. Improved activity of ADL;s. \par \par 08/18/22 - The patient presents for initial evaluation regarding their low back pain. Patient c/o left sided low back pain radiating to the left posterior thigh, stopping at the knee. She reports back pain > LLE pain. Long standing hx with a recent progression. She reports increase pain with prolonged walking. Patient reports having an MILD procedure done on 05/2022 with outside physician in [place]. She reports no observable benefit following the procedure. Was not able to improve walking tolerance. Hx of YASH's in the past with short lived response with multiple outside providers. No benefit with acupuncture. Patient has not done any recent PT. Utilizes OTC Advil PRN. \par  \par Injections:\par 1) MILD procedure - Dr. Zamora ([facility]) \par 2) YASH's - multiple outside providers\par 3) left L3-4, L4-5, L5-S1 facet joint RFA (10/5/2022) \par 4) left L3-L4, L4-L5 TFESI (6/30/2023)\par \par Pertinent Surgical History: N/A \par  \par Imaging: \par 1) MRI Lumbar Spine (6/8/2023) - [facility]\par Findings: There is exaggerated thoracic kyphosis, exaggerated lumbar lordosis, and convexity of the upper \par lumbar spine towards the right and lower lumbar spine towards the left with grade 1 anterolisthesis at L3-L4, \par L4-L5, and L5-S1. There are degenerative changes in the lower thoracic spine with right paracentral/foraminal \par herniation impinging the right exiting nerve root at T10-T11. The conus appears unremarkable. There are findings suggesting multiple small bilateral renal cysts incompletely evaluated on the current exam. There is no gross retroperitoneal adenopathy or mass.\par L1-L2: There is mild diffuse disc bulging and facet hypertrophy.\par L2-L3: There is disc bulging, bony ridging, facet arthrosis, broad-based posterior disc herniation, mild central stenosis, and bilateral exiting L2 nerve root impingement.\par L3-L4: There is disc bulging, bony ridging, facet arthrosis, broad-based posterior disc herniation, moderate central stenosis, and left greater than right exiting nerve root impingement.\par L4-L5: There is disc bulging, bony ridging, facet arthrosis, broad-based posterior disc herniation, moderate-tosevere central stenosis asymmetric on the right and impingement upon right greater than left exiting L4 nerve \par roots.\par L5-S1: There is disc bulging, bony ridging, facet arthrosis, and right foraminal herniation impinging the right exiting L5 nerve root.\par \par 2) MRI Thoracic Spine (6/8/2023)- [facility]\par Findings: There is straightening of the visualized cervical lordosis and degenerative changes in the visualized \par cervical spine, incompletely evaluated on the current exam. There is mild exaggerated thoracic kyphosis. There \par is multilevel disc desiccation in the thoracic spine. There is loss of disc height with mild degenerative endplate \par changes at C7-T1. There is a small posterior central protrusion effacing the thecal sac at T5-T6. There is a small \par posterior central protrusion effacing the thecal sac at T6-T7. There is a small posterior central protrusion \par effacing the thecal sac at T7-T8. There is a right paracentral/foraminal herniation impinging the right exiting \par nerve root at T10-T11 with asymmetric right foraminal narrowing. There are mild scattered degenerative \par endplate changes and anterior spondylosis without acute thoracic vertebral body fracture. The thoracic spinal \par cord appears intact. There is nonspecific soft tissue scarring and fibrosis in the subcutaneous fat along the \par midline of the upper thoracic spine possibly related to prior surgery without edema or swelling in the area. \par There are findings suggesting multiple small bilateral renal cysts incompletely evaluated on the current exam. \par There is no gross paravertebral soft tissue mass. There is no cord compression.\par \par Physician Disclaimer: I have personally reviewed and confirmed all HPI data with the patient.  [de-identified] : 10/20/2021

## 2023-07-17 NOTE — ASSESSMENT
[FreeTextEntry1] : A discussion regarding available pain management treatment options occurred with the patient.  These included interventional, rehabilitative, pharmacological, and alternative modalities. We will proceed with the following: \par \par Interventional treatment options:\par - Proceed with (2-lead) [place] DCS trial with fluoroscopic guidance\par - could consider repeat left L3-L4, L4-L5, L5-S1 facet joint RFA with return of severe axial lower back pain\par - patient has failed previous YASH's and MILD procedure with outside providers \par - see additional instructions below \par \par Rehabilitative options: \par - continue physical therapy \par - participation in active HEP was discussed \par \par Medication based treatment options: \par - Continue gabapentin 1200 mg daily; further titration based upon clinical response\par - Caution with increasing doses of gabapentin and consideration of lower extremity edema\par - continue OTC NSAIDs on PRN basis\par - see additional instructions below \par \par Complementary treatment options: \par - Weight management and lifestyle modifications discussed \par - failed acupuncture\par \par Additional treatment recommendations as follows: \par - Patient continues to defer ortho spine evaluation\par - Obtain psychological evaluation in anticipation of DCS trial -referral provided\par - Follow up 5-7 days post DCS trial for assessment of efficacy and further treatment recommendations\par \par We have discussed the risks, benefits, and alternatives NSAID therapy including but not limited to the risk of bleeding, thrombosis, gastric mucosal irritation/ulceration, allergic reaction and kidney dysfunction; the patient verbalizes an understanding.\par \par The R/B/A of dorsal column and/or DRG stimulator including but not limited to bleeding, infection, nerve injury, dural puncture, PDPH, lead migration, and implant malfunction requiring revision surgery was discussed in detail.  The patient verbalized an understanding and agreed.  All questions were answered to patient's apparent satisfaction and he/she verbalized an understanding.
TENDERNESS/neck supple/RANGE OF MOTION LIMITED/motor intact

## 2023-07-17 NOTE — DATA REVIEWED
[MRI] : MRI [Thoracic Spine] : thoracic spine [Lumbar Spine] : lumbar spine [Report was reviewed and noted in the chart] : The report was reviewed and noted in the chart [I reviewed the films/CD] : I reviewed the films/CD

## 2023-09-14 ENCOUNTER — APPOINTMENT (OUTPATIENT)
Dept: PAIN MANAGEMENT | Facility: CLINIC | Age: 78
End: 2023-09-14
Payer: MEDICARE

## 2023-09-14 VITALS — HEIGHT: 65 IN | BODY MASS INDEX: 40.82 KG/M2 | WEIGHT: 245 LBS

## 2023-09-14 DIAGNOSIS — M79.2 NEURALGIA AND NEURITIS, UNSPECIFIED: ICD-10-CM

## 2023-09-14 PROCEDURE — 99213 OFFICE O/P EST LOW 20 MIN: CPT

## 2023-10-06 ENCOUNTER — APPOINTMENT (OUTPATIENT)
Dept: PAIN MANAGEMENT | Facility: CLINIC | Age: 78
End: 2023-10-06
Payer: MEDICARE

## 2023-10-06 PROCEDURE — J3490M: CUSTOM | Mod: NC

## 2023-10-06 PROCEDURE — 64636Z: CUSTOM | Mod: LT

## 2023-10-06 PROCEDURE — 64635 DESTROY LUMB/SAC FACET JNT: CPT | Mod: LT

## 2023-10-16 ENCOUNTER — APPOINTMENT (OUTPATIENT)
Dept: PAIN MANAGEMENT | Facility: CLINIC | Age: 78
End: 2023-10-16
Payer: MEDICARE

## 2023-10-16 VITALS — WEIGHT: 250 LBS | HEIGHT: 65 IN | BODY MASS INDEX: 41.65 KG/M2

## 2023-10-16 DIAGNOSIS — M54.16 RADICULOPATHY, LUMBAR REGION: ICD-10-CM

## 2023-10-16 DIAGNOSIS — M51.36 OTHER INTERVERTEBRAL DISC DEGENERATION, LUMBAR REGION: ICD-10-CM

## 2023-10-16 DIAGNOSIS — M48.062 SPINAL STENOSIS, LUMBAR REGION WITH NEUROGENIC CLAUDICATION: ICD-10-CM

## 2023-10-16 DIAGNOSIS — M47.816 SPONDYLOSIS W/OUT MYELOPATHY OR RADICULOPATHY, LUMBAR REGION: ICD-10-CM

## 2023-10-16 PROCEDURE — 99213 OFFICE O/P EST LOW 20 MIN: CPT

## 2023-10-16 RX ORDER — GABAPENTIN 300 MG/1
300 CAPSULE ORAL 4 TIMES DAILY
Qty: 120 | Refills: 2 | Status: ACTIVE | COMMUNITY
Start: 2023-10-16 | End: 1900-01-01

## 2023-10-17 PROBLEM — M47.816 LUMBAR SPONDYLOSIS: Status: ACTIVE | Noted: 2022-08-18

## 2023-10-17 PROBLEM — M51.36 DISC DEGENERATION, LUMBAR: Status: ACTIVE | Noted: 2022-08-18

## 2023-10-17 PROBLEM — M48.062 SPINAL STENOSIS OF LUMBAR REGION WITH NEUROGENIC CLAUDICATION: Status: ACTIVE | Noted: 2022-08-18

## 2023-10-17 PROBLEM — M54.16 LUMBAR RADICULOPATHY: Status: ACTIVE | Noted: 2022-08-18

## 2023-11-06 ENCOUNTER — APPOINTMENT (OUTPATIENT)
Dept: PAIN MANAGEMENT | Facility: CLINIC | Age: 78
End: 2023-11-06

## 2023-12-14 ENCOUNTER — APPOINTMENT (OUTPATIENT)
Dept: ORTHOPEDIC SURGERY | Facility: CLINIC | Age: 78
End: 2023-12-14

## 2023-12-18 ENCOUNTER — APPOINTMENT (OUTPATIENT)
Dept: PAIN MANAGEMENT | Facility: CLINIC | Age: 78
End: 2023-12-18

## 2023-12-18 NOTE — ASSESSMENT
[FreeTextEntry1] : A discussion regarding available pain management treatment options occurred with the patient. These included interventional, rehabilitative, pharmacological, and alternative modalities. We will proceed with the following:  Interventional treatment options: - none indicated at present time - Would proceed with (2-lead) [place] DCS trial with fluoroscopic guidance with ongoing low back and leg pain - patient has failed previous YASH's and MILD procedure with outside providers - see additional instructions below  Rehabilitative options: - continue physical therapy - participation in active HEP was discussed  Medication based treatment options: - Continue gabapentin 1200 mg daily; further titration based upon clinical response - Caution with increasing doses of gabapentin and consideration of lower extremity edema - continue OTC NSAIDs on PRN basis - see additional instructions below  Complementary treatment options: - Weight management and lifestyle modifications discussed - failed acupuncture  Additional treatment recommendations as follows: - Patient continues to defer ortho spine evaluation - Patient requests orthopedic evaluation for bilateral knees - referral provided - Follow-up in 8 weeks  We have discussed the risks, benefits, and alternatives NSAID therapy including but not limited to the risk of bleeding, thrombosis, gastric mucosal irritation/ulceration, allergic reaction and kidney dysfunction; the patient verbalizes an understanding.  IGeo acting as scribe, attest that this documentation has been prepared under the direction and in the presence of Provider Ash Mireles DO.

## 2023-12-18 NOTE — HISTORY OF PRESENT ILLNESS
[FreeTextEntry1] : 2023- Patient presents for FUV regarding their lower back pain.   10/16/2023 - Patient presents for a FUV after Left L3-L4, L4-5, L5-S1 facet joint radiofrequency ablation with fluoroscopic guidance on 10/06/2023. Patient reports 40% pain relief and improvement in her left leg paresthesias.  She notes improved walking tolerance and sleep tolerance.   2023- Patient presents for FUV regarding their lower back pain. Patient has participated in PT for her lower back with meaningful benefit. Currently the patient's complaint is pain bilaterally in the knees. Patient continues to take 1200 mg of gabapentin daily.  2023 - Patient presents for follow-up visit after left L3-L4, L4-L5 TFESI on 2023.  She reports some improvement of her left radicular pain following YASH.  She feels that she is doing better than she has done over the last few years following interventional treatment.  She still continues to experience pain and limitation in functionality however.  She is currently involved in physical therapy also with benefit.  Currently on gabapentin 600 mg BID.  Wishes to discuss DCS trial once again in further detail.  2023- Patient presents for FUV regarding their lower back pain, and to review the lumbar and thoracic spine MRIs done on 2023.   Her pain symptoms have not changed since the last visit, her lower back pain remains resolved from the previous RFA, and she still has radicular pain in the lateral and anterior left thigh, and occasionally the posterior left thigh.  Wishes to discuss candidacy for DCS trial  2023- Patient presents for FUV regarding their lower back pain.  Patient back from [place].  She continue to complain of left buttock pain with radiation down her leg associated with numbness.  She continues to take Gabapentin 300 mg TID.  She was recently given a TPI in her left buttock by a pain physician and was started on Lyrica.  Patient self D/c Lyrica d/t being ineffective.  Patient continues to participate in physical therapy with benefit.  Continues to report benefit of left-sided axial low back pain from RFA performed over 8 months ago.  10/20/2022 - Patient presents after left L3-L4, L4-L5, L5-S1 facet joint RFA on 10/5/2022.  Patient reports an overall >80% sustained relief of axial left sided lower back pain.  She reports improved walking and standing tolerance. Reports that she has been ambulating more often without the assistance of her walker. Patient reports a minimal to no left axial lower back pain, reports that the majority of her pain is in the left lower buttock and thigh. Patient reports that since she has been taking gabapentin (currently on 300mg TID) she began to experience mild edema in her bilateral lower extremities.  Despite this she finds gabapentin helpful in reducing her pain.   9/15/22 - Patient presents for a FUV following an left L3-L4, L4-L5, L5-S1 facet joint MBB  #1 on 22. Patient reports >50% reduction of left sided back pain the day of the procedure. Improved activity of ADL;s.   22 - The patient presents for initial evaluation regarding their low back pain. Patient c/o left sided low back pain radiating to the left posterior thigh, stopping at the knee. She reports back pain > LLE pain. Long standing hx with a recent progression. She reports increase pain with prolonged walking. Patient reports having an MILD procedure done on 2022 with outside physician in [place]. She reports no observable benefit following the procedure. Was not able to improve walking tolerance. Hx of YASH's in the past with short lived response with multiple outside providers. No benefit with acupuncture. Patient has not done any recent PT. Utilizes OTC Advil PRN.    Injections: 1) MILD procedure - Dr. Zamora ([facility])  2) YASH's - multiple outside providers 3) left L3-4, L4-5, L5-S1 facet joint RFA (10/5/2022, 10/06/2023)  4) left L3-L4, L4-L5 TFESI (2023)  Pertinent Surgical History: N/A    Imagin) MRI Lumbar Spine (2023) - [facility] Findings: There is exaggerated thoracic kyphosis, exaggerated lumbar lordosis, and convexity of the upper lumbar spine towards the right and lower lumbar spine towards the left with grade 1 anterolisthesis at L3-L4, L4-L5, and L5-S1. There are degenerative changes in the lower thoracic spine with right paracentral/foraminal herniation impinging the right exiting nerve root at T10-T11. The conus appears unremarkable. There are findings suggesting multiple small bilateral renal cysts incompletely evaluated on the current exam. There is no gross retroperitoneal adenopathy or mass. L1-L2: There is mild diffuse disc bulging and facet hypertrophy. L2-L3: There is disc bulging, bony ridging, facet arthrosis, broad-based posterior disc herniation, mild central stenosis, and bilateral exiting L2 nerve root impingement. L3-L4: There is disc bulging, bony ridging, facet arthrosis, broad-based posterior disc herniation, moderate central stenosis, and left greater than right exiting nerve root impingement. L4-L5: There is disc bulging, bony ridging, facet arthrosis, broad-based posterior disc herniation, moderate-tosevere central stenosis asymmetric on the right and impingement upon right greater than left exiting L4 nerve roots. L5-S1: There is disc bulging, bony ridging, facet arthrosis, and right foraminal herniation impinging the right exiting L5 nerve root.  2) MRI Thoracic Spine (2023)- [facility]  Physician Disclaimer: I have personally reviewed and confirmed all HPI data with the patient.

## 2023-12-18 NOTE — PHYSICAL EXAM
[de-identified] : Constitutional:  - No acute distress  - Obesity   Neurological:  - normal mood and affect  - alert and oriented x 3    Cardiovascular:  - Bilateral lower extremity edema  Lumbar Spine Exam:   Inspection:  erythema (-)  ecchymosis (-)  rashes (-)  alignment: no scoliosis  Palpation:   paraspinal tenderness:                  L (-) ; R (-)  thoracic paraspinal tenderness:    L (-) ; R (-)  sciatic nerve tenderness :             L (+) ; R (-)  SI joint tenderness:                        L (+) ; R (-)  GTB tenderness:                            L (-);  R (-)   ROM:    Full ROM with mild stiffness at extremes of extension Pain at extremes of extension  Strength:                   Right       Left     Hip Flexion:                (5/5)       (5/5)  Quadriceps:               (5/5)       (5/5)  Hamstrings:                (5/5)       (5/5)  Ankle Dorsiflexion:     (5/5)       (5/5)  EHL:                            (5/5)       (5/5)  Ankle Plantarflexion:  (5/5)       (5/5)   Special Tests:  SLR:                      R (-) ; L (-)  Facet loading:       R (-) ; L (-)  CHARMAINE test:          R (-) ; L (-) Tight Hamstrings   R (-) ; L (-)   Neurologic:  Light touch intact throughout LE bilaterally 1+ Reflexes B/L patella bilaterally Absent Achilles reflexes bilaterally  Gait:  antalgic gait  Ambulates with assistance of walker